# Patient Record
Sex: MALE | Race: BLACK OR AFRICAN AMERICAN | NOT HISPANIC OR LATINO
[De-identification: names, ages, dates, MRNs, and addresses within clinical notes are randomized per-mention and may not be internally consistent; named-entity substitution may affect disease eponyms.]

---

## 2020-04-20 ENCOUNTER — TRANSCRIPTION ENCOUNTER (OUTPATIENT)
Age: 35
End: 2020-04-20

## 2020-04-27 PROBLEM — Z00.00 ENCOUNTER FOR PREVENTIVE HEALTH EXAMINATION: Status: ACTIVE | Noted: 2020-04-27

## 2020-04-28 ENCOUNTER — APPOINTMENT (OUTPATIENT)
Dept: GASTROENTEROLOGY | Facility: CLINIC | Age: 35
End: 2020-04-28
Payer: COMMERCIAL

## 2020-04-28 DIAGNOSIS — K62.89 OTHER SPECIFIED DISEASES OF ANUS AND RECTUM: ICD-10-CM

## 2020-04-28 DIAGNOSIS — K92.1 MELENA: ICD-10-CM

## 2020-04-28 DIAGNOSIS — Z78.9 OTHER SPECIFIED HEALTH STATUS: ICD-10-CM

## 2020-04-28 DIAGNOSIS — Z72.89 OTHER PROBLEMS RELATED TO LIFESTYLE: ICD-10-CM

## 2020-04-28 DIAGNOSIS — R19.8 OTHER SPECIFIED SYMPTOMS AND SIGNS INVOLVING THE DIGESTIVE SYSTEM AND ABDOMEN: ICD-10-CM

## 2020-04-28 DIAGNOSIS — Z80.9 FAMILY HISTORY OF MALIGNANT NEOPLASM, UNSPECIFIED: ICD-10-CM

## 2020-04-28 DIAGNOSIS — F17.290 NICOTINE DEPENDENCE, OTHER TOBACCO PRODUCT, UNCOMPLICATED: ICD-10-CM

## 2020-04-28 PROCEDURE — 99203 OFFICE O/P NEW LOW 30 MIN: CPT | Mod: 95

## 2020-04-28 RX ORDER — BENZONATATE 200 MG/1
200 CAPSULE ORAL
Qty: 21 | Refills: 0 | Status: ACTIVE | COMMUNITY
Start: 2020-03-23

## 2020-04-28 RX ORDER — IPRATROPIUM BROMIDE 42 UG/1
0.06 SPRAY NASAL
Qty: 15 | Refills: 0 | Status: ACTIVE | COMMUNITY
Start: 2020-03-23

## 2020-04-28 RX ORDER — LINACLOTIDE 72 UG/1
72 CAPSULE, GELATIN COATED ORAL
Qty: 30 | Refills: 3 | Status: ACTIVE | COMMUNITY
Start: 2020-04-28 | End: 1900-01-01

## 2020-04-28 RX ORDER — HYDROCORTISONE 2.5% 25 MG/G
2.5 CREAM TOPICAL
Qty: 60 | Refills: 0 | Status: ACTIVE | COMMUNITY
Start: 2020-04-20

## 2020-04-28 RX ORDER — MESALAMINE 1000 MG/1
1000 SUPPOSITORY RECTAL
Qty: 30 | Refills: 3 | Status: ACTIVE | COMMUNITY
Start: 2020-04-28 | End: 1900-01-01

## 2020-04-28 RX ORDER — AMOXICILLIN AND CLAVULANATE POTASSIUM 875; 125 MG/1; MG/1
875-125 TABLET, COATED ORAL
Qty: 20 | Refills: 0 | Status: ACTIVE | COMMUNITY
Start: 2020-03-23

## 2020-04-28 NOTE — ASSESSMENT
[FreeTextEntry1] : BRBPR\par \par Eventual Colon\par \par [place]\par \par Stool softners\par \par ? HET\par \par I discussed the risks benefits and alternatives of Hemorrhoid Electrical Treatment at length. we discussed the procedure and the recovery period. We also discussed that periodically additional / surgical hemorrhoid treatment may become necessary.\par \par \par IBS-C\par \par The causes of constipation were discussed at length  We discussed : Eat three meals each day. Do not skip meals. Gradually increase the amount of FIBER  in your diet. Choose more whole grain breads, cereals and rice. Select more raw fruits and vegetables -- eat the peel, if appropriate. Read food labels and look for the "dietary fiber" content of foods. Good sources have 2 grams of fiber or more. Drink six to eight glasses of water each day. Limit highly refined and processed foods. \par \par I spent 35 min discussing care , results , follow up and answering all pts questions\par

## 2020-04-28 NOTE — HISTORY OF PRESENT ILLNESS
[Patient] : the patient [Medical Office: ([facility])___] : at the medical office located in  [Home] : at home, [unfilled] , at the time of the visit. [de-identified] : Pt with constipation on and off x many months \par \par Now has intermittent BRB on paper\par \par Feels hemorrhoids\par \par No alarm SX \par \par No CP/SOB\par \par Says had recent CBC - OK \par \par No prior colon [Self] : self

## 2020-06-19 VITALS
DIASTOLIC BLOOD PRESSURE: 75 MMHG | SYSTOLIC BLOOD PRESSURE: 127 MMHG | HEART RATE: 71 BPM | OXYGEN SATURATION: 100 % | WEIGHT: 199.96 LBS | HEIGHT: 78 IN | RESPIRATION RATE: 18 BRPM | TEMPERATURE: 98 F

## 2020-06-19 NOTE — H&P PST ADULT - ADDITIONAL PE
physical exam at bedside for scheduled surgery today  history taken by Nettie at another date  LACI Haynes-C

## 2020-06-22 NOTE — ASU DISCHARGE PLAN (ADULT/PEDIATRIC) - CARE PROVIDER_API CALL
Ryan Robertson  COLON/RECTAL SURGERY  [address] [facility] [address]  [place] [address]  Phone: [phone]  Fax: [phone]  Follow Up Time:

## 2020-06-23 ENCOUNTER — OUTPATIENT (OUTPATIENT)
Dept: OUTPATIENT SERVICES | Facility: HOSPITAL | Age: 35
LOS: 1 days | End: 2020-06-23
Payer: COMMERCIAL

## 2020-06-23 DIAGNOSIS — Z98.890 OTHER SPECIFIED POSTPROCEDURAL STATES: Chronic | ICD-10-CM

## 2020-06-23 DIAGNOSIS — K60.0 ACUTE ANAL FISSURE: ICD-10-CM

## 2020-06-23 DIAGNOSIS — Z11.59 ENCOUNTER FOR SCREENING FOR OTHER VIRAL DISEASES: ICD-10-CM

## 2020-06-23 DIAGNOSIS — Z01.818 ENCOUNTER FOR OTHER PREPROCEDURAL EXAMINATION: ICD-10-CM

## 2020-06-23 DIAGNOSIS — K60.2 ANAL FISSURE, UNSPECIFIED: ICD-10-CM

## 2020-06-23 LAB — SARS-COV-2 RNA SPEC QL NAA+PROBE: SIGNIFICANT CHANGE UP

## 2020-06-23 PROCEDURE — U0003: CPT

## 2020-06-23 PROCEDURE — G0463: CPT

## 2020-06-24 ENCOUNTER — TRANSCRIPTION ENCOUNTER (OUTPATIENT)
Age: 35
End: 2020-06-24

## 2020-06-25 ENCOUNTER — OUTPATIENT (OUTPATIENT)
Dept: OUTPATIENT SERVICES | Facility: HOSPITAL | Age: 35
LOS: 1 days | End: 2020-06-25
Payer: COMMERCIAL

## 2020-06-25 VITALS
HEIGHT: 78 IN | RESPIRATION RATE: 18 BRPM | HEART RATE: 71 BPM | SYSTOLIC BLOOD PRESSURE: 127 MMHG | WEIGHT: 191.14 LBS | DIASTOLIC BLOOD PRESSURE: 75 MMHG | OXYGEN SATURATION: 100 % | TEMPERATURE: 98 F

## 2020-06-25 VITALS
RESPIRATION RATE: 16 BRPM | SYSTOLIC BLOOD PRESSURE: 118 MMHG | HEART RATE: 60 BPM | DIASTOLIC BLOOD PRESSURE: 71 MMHG | OXYGEN SATURATION: 98 %

## 2020-06-25 DIAGNOSIS — K60.0 ACUTE ANAL FISSURE: ICD-10-CM

## 2020-06-25 DIAGNOSIS — Z98.890 OTHER SPECIFIED POSTPROCEDURAL STATES: Chronic | ICD-10-CM

## 2020-06-25 PROCEDURE — 46080 SPHNCTROTMY ANAL DIV SPHNCTR: CPT

## 2020-06-25 RX ORDER — SODIUM CHLORIDE 9 MG/ML
1000 INJECTION, SOLUTION INTRAVENOUS
Refills: 0 | Status: DISCONTINUED | OUTPATIENT
Start: 2020-06-25 | End: 2020-06-25

## 2020-06-25 RX ORDER — ONDANSETRON 8 MG/1
4 TABLET, FILM COATED ORAL ONCE
Refills: 0 | Status: DISCONTINUED | OUTPATIENT
Start: 2020-06-25 | End: 2020-06-25

## 2020-06-25 RX ORDER — OXYCODONE HYDROCHLORIDE 5 MG/1
5 TABLET ORAL ONCE
Refills: 0 | Status: DISCONTINUED | OUTPATIENT
Start: 2020-06-25 | End: 2020-06-25

## 2020-06-25 RX ORDER — CEFOTETAN DISODIUM 1 G
2 VIAL (EA) INJECTION ONCE
Refills: 0 | Status: COMPLETED | OUTPATIENT
Start: 2020-06-25 | End: 2020-06-25

## 2020-06-25 RX ORDER — HYDROMORPHONE HYDROCHLORIDE 2 MG/ML
0.5 INJECTION INTRAMUSCULAR; INTRAVENOUS; SUBCUTANEOUS
Refills: 0 | Status: DISCONTINUED | OUTPATIENT
Start: 2020-06-25 | End: 2020-06-25

## 2020-06-25 RX ADMIN — SODIUM CHLORIDE 75 MILLILITER(S): 9 INJECTION, SOLUTION INTRAVENOUS at 15:33

## 2020-06-25 RX ADMIN — SODIUM CHLORIDE 75 MILLILITER(S): 9 INJECTION, SOLUTION INTRAVENOUS at 13:34

## 2020-06-25 NOTE — PROGRESS NOTE ADULT - SUBJECTIVE AND OBJECTIVE BOX
physical exam at bedside for admission for anal fistulectomy today  NDKA  NPO since 2300 6/25/2020  Vital signs normal

## 2020-06-25 NOTE — BRIEF OPERATIVE NOTE - OPERATION/FINDINGS
partially healed posterior anal fissure. Removal of soft brown stool with ga retractor, irrigation, and suction. Left lateral sphincterotomy, closed with running vicryl stitch

## 2020-06-25 NOTE — BRIEF OPERATIVE NOTE - NSICDXBRIEFPROCEDURE_GEN_ALL_CORE_FT
PROCEDURES:  Fecal disimpaction 25-Jun-2020 15:07:30  Jacinda Faria  Left lateral anal sphincterotomy 25-Jun-2020 15:07:01  Jacinda Faria

## 2021-06-30 NOTE — ASU DISCHARGE PLAN (ADULT/PEDIATRIC) - CALL YOUR DOCTOR IF YOU HAVE ANY OF THE FOLLOWING:
Pain not relieved by Medications/Fever greater than (need to indicate Fahrenheit or Celsius)/Wound/Surgical Site with redness, or foul smelling discharge or pus/Swelling that gets worse/Bleeding that does not stop/Increased irritability or sluggishness/Nausea and vomiting that does not stop/Unable to urinate/Excessive diarrhea/Inability to tolerate liquids or foods
Normal for race

## 2023-08-04 NOTE — H&P PST ADULT - FUNCTIONAL LEVEL PRIOR: BATHING
8/4/2023    · Premedication  · Take 75mg (=1.5 tablets) of Metoprolol tartrate on Tuesday night at bedtime  · Take 75mg (=1.5 tablets) of Metoprolol tartrate on Wednesday morning at 9AM  · If you do not take the pre-medication as prescribed your test may need to be rescheduled.  · Do NOT take your Losartan on Wednesday morning.  Instead you may bring your Losartan with you to take after the procedure.    Writer left message for patient pertaining to upcoming CTA Coronary artery imaging. Provided pre procedure instructions, confirmed imaging date, arrival time, and location. Contact information left for any questions or concerns.     0 = independent

## 2025-06-11 NOTE — ASU DISCHARGE PLAN (ADULT/PEDIATRIC) - ASU DC SPECIAL INSTRUCTIONSFT
Follow all verbal and written instructions. Take medications as prescribed. DO NOT drive, operate machinery, and/or make important decisions while on prescription pain medication. DO NOT hesitate to call Doctor's office with questions or concerns. Certain prescription pain medication can cause constipation; take a stool softener such as Colace 100mg 3 x a day, to avoid the constipating effects of prescription pain medication. You just had anorectal surgery. Please follow the instructions below to make your recovery as comfortable as possible.    1. Depending on the procedure, you may or may not have pain. Please fill any prescriptions you have been given and take as directed.    You may take plain Tylenol, ibuprofen (Advil, Motrin), or Aleve if you wish. Do not take Ibuprofen or Aleve if you have been given a prescription for ketorolac (Toradol). Do not take Tylenol at the same time as oxycodone/acetaminophen (Percocet) or hydrocodone/acetaminophen (Vicodin). You may alternate doses with Tylenol.    If you take aspirin, warfarin (Coumadin), Plavix or any other blood thinner, ask your surgeon when you should re-start these medications.    2. Keep your stool nice and soft to avoid trauma to healing area with hard stool:    - Metamucil or Konsyl (fiber supplement, available over the counter), 1 tablespoon in 8 oz. of water or juice twice a day  - Colace (stool softener, available over the counter), 100mg three times a day  - If you do not have a bowel movement in two days, take 2 tablespoons of liquid Milk of Magnesia (available over the counter) with each meal until you do, then stop taking the Milk of Magnesia.     3. SITZ BATHS (plain warm water) at least four times a day and after every bowel movement for 15-20min each. Avoid toilet paper after a bowel movement, use soap and water or unscented wipes.    4. You should expect some blood staining of your dressing or on the toilet paper. If bleeding is persistent, but light, apply direct and gentle pressure to the area and lie flat in bed for 10-15 min. If bleeding is persistent and heavy or is associated with clots call the office immediately.    5. If you are unable to urinate in 6-8 hours, sit in a warm water bath for 15-20 min and allow yourself to urinate in the bath. If you are still unable to urinate, call the office.    *Please call the office for an appointment when you get home ([phone]). If you have any questions, problems or concerns, do not hesitate to call the office. [FreeTextEntry1] : Shane is a 77-year-old male with history of AF declines AC and EKGs, HTN, CAD, ED, HL, cardiomyopathy.  No history of MI, revascularization, VHD, CHF, TIA, CVA, diabetes, PVD, DVT, PE.  Patient has dyspnea with moderate exertion.  Cardiovascular review of symptoms is negative for exertional chest pain, palpitations, dizziness or syncope.  No PND or orthopnea leg edema.  No bleeding or black stool.  No exercise routine.  Patient is walking 10 minutes on occasion.